# Patient Record
Sex: MALE | Race: WHITE | NOT HISPANIC OR LATINO | Employment: OTHER | URBAN - METROPOLITAN AREA
[De-identification: names, ages, dates, MRNs, and addresses within clinical notes are randomized per-mention and may not be internally consistent; named-entity substitution may affect disease eponyms.]

---

## 2017-07-10 ENCOUNTER — OFFICE VISIT (OUTPATIENT)
Dept: INTERNAL MEDICINE | Facility: CLINIC | Age: 67
End: 2017-07-10

## 2017-07-10 VITALS
BODY MASS INDEX: 31.78 KG/M2 | OXYGEN SATURATION: 98 % | HEART RATE: 73 BPM | DIASTOLIC BLOOD PRESSURE: 82 MMHG | HEIGHT: 70 IN | SYSTOLIC BLOOD PRESSURE: 128 MMHG | WEIGHT: 222 LBS

## 2017-07-10 DIAGNOSIS — E11.9 CONTROLLED TYPE 2 DIABETES MELLITUS WITHOUT COMPLICATION, WITHOUT LONG-TERM CURRENT USE OF INSULIN (HCC): ICD-10-CM

## 2017-07-10 DIAGNOSIS — I25.10 CORONARY ARTERIOSCLEROSIS IN NATIVE ARTERY: ICD-10-CM

## 2017-07-10 DIAGNOSIS — I35.9 AORTIC VALVE DISORDER: Primary | ICD-10-CM

## 2017-07-10 DIAGNOSIS — E78.2 MIXED HYPERLIPIDEMIA: ICD-10-CM

## 2017-07-10 LAB
EXPIRATION DATE: NORMAL
HBA1C MFR BLD: 5.7 %
Lab: NORMAL

## 2017-07-10 PROCEDURE — 83036 HEMOGLOBIN GLYCOSYLATED A1C: CPT | Performed by: INTERNAL MEDICINE

## 2017-07-10 PROCEDURE — 99214 OFFICE O/P EST MOD 30 MIN: CPT | Performed by: INTERNAL MEDICINE

## 2017-07-10 RX ORDER — GLIMEPIRIDE 4 MG/1
TABLET ORAL
COMMUNITY
Start: 2017-04-29 | End: 2018-01-22 | Stop reason: SDUPTHER

## 2017-07-10 RX ORDER — AMOXICILLIN 500 MG/1
CAPSULE ORAL
COMMUNITY
Start: 2017-06-27 | End: 2018-01-22

## 2017-07-10 NOTE — PROGRESS NOTES
Subjective   Cuco Easton is a 66 y.o. male.     History of Present Illness   For follow up of  HTN on meds no chest pain sob or headaches.  Aortic valve disease, no sob or palpitations.  Hyperlipidemia on meds, no muscle aches.  NIDDM sugars have been doing well.      The following portions of the patient's history were reviewed and updated as appropriate: allergies, current medications, past medical history and problem list.    Review of Systems   Constitutional: Negative.    Respiratory: Negative.  Negative for cough, chest tightness, shortness of breath and wheezing.    Cardiovascular: Negative.  Negative for chest pain, palpitations and leg swelling.   Gastrointestinal: Negative.  Negative for abdominal distention and abdominal pain.       Objective   Physical Exam   Constitutional: He appears well-developed and well-nourished.   Neck: Normal range of motion. Neck supple.   Cardiovascular: Normal rate, regular rhythm and normal heart sounds.  Exam reveals no gallop and no friction rub.    No murmur heard.  Pulmonary/Chest: Effort normal and breath sounds normal. No respiratory distress. He has no wheezes. He has no rales.   Abdominal: Soft. Bowel sounds are normal. He exhibits no distension and no mass. There is no tenderness.   Nursing note and vitals reviewed.      Assessment/Plan   Cuco was seen today for follow-up.    Diagnoses and all orders for this visit:    Aortic valve disorder    Coronary arteriosclerosis in native artery    Mixed hyperlipidemia    Controlled type 2 diabetes mellitus without complication, without long-term current use of insulin  -     POC Glycosylated Hemoglobin (Hb A1C)    A1C = 5.7  All problems are stable.  Same med.  Recheck 6 months.

## 2018-01-01 ENCOUNTER — HOSPITAL ENCOUNTER (OUTPATIENT)
Dept: ULTRASOUND IMAGING | Facility: HOSPITAL | Age: 68
Discharge: HOME | End: 2018-10-04
Admitting: PHYSICIAN ASSISTANT

## 2018-01-01 DIAGNOSIS — Z13.6 ENCOUNTER FOR ABDOMINAL AORTIC ANEURYSM (AAA) SCREENING: ICD-10-CM

## 2018-01-01 DIAGNOSIS — Z87.891 HISTORY OF TOBACCO USE DISORDER: ICD-10-CM

## 2018-01-01 DIAGNOSIS — Z00.00 INITIAL MEDICARE ANNUAL WELLNESS VISIT: ICD-10-CM

## 2018-01-01 PROCEDURE — 76706 US ABDL AORTA SCREEN AAA: CPT

## 2018-01-22 ENCOUNTER — OFFICE VISIT (OUTPATIENT)
Dept: INTERNAL MEDICINE | Facility: CLINIC | Age: 68
End: 2018-01-22

## 2018-01-22 VITALS
HEART RATE: 68 BPM | SYSTOLIC BLOOD PRESSURE: 138 MMHG | WEIGHT: 219 LBS | HEIGHT: 70 IN | BODY MASS INDEX: 31.35 KG/M2 | DIASTOLIC BLOOD PRESSURE: 82 MMHG | RESPIRATION RATE: 17 BRPM

## 2018-01-22 DIAGNOSIS — Z79.899 DRUG THERAPY: ICD-10-CM

## 2018-01-22 DIAGNOSIS — E78.2 MIXED HYPERLIPIDEMIA: ICD-10-CM

## 2018-01-22 DIAGNOSIS — E11.9 CONTROLLED TYPE 2 DIABETES MELLITUS WITHOUT COMPLICATION, WITHOUT LONG-TERM CURRENT USE OF INSULIN (HCC): ICD-10-CM

## 2018-01-22 DIAGNOSIS — Z12.5 SCREENING FOR PROSTATE CANCER: ICD-10-CM

## 2018-01-22 DIAGNOSIS — I35.9 AORTIC VALVE DISORDER: Primary | ICD-10-CM

## 2018-01-22 DIAGNOSIS — I25.10 CORONARY ARTERIOSCLEROSIS IN NATIVE ARTERY: ICD-10-CM

## 2018-01-22 LAB
A/C: NORMAL
ALBUMIN SERPL-MCNC: 4.7 G/DL (ref 3.2–4.8)
ALBUMIN/GLOB SERPL: 1.7 G/DL (ref 1.5–2.5)
ALP SERPL-CCNC: 70 U/L (ref 25–100)
ALT SERPL W P-5'-P-CCNC: 37 U/L (ref 7–40)
ANION GAP SERPL CALCULATED.3IONS-SCNC: 12 MMOL/L (ref 3–11)
ARTICHOKE IGE QN: 83 MG/DL (ref 0–130)
AST SERPL-CCNC: 25 U/L (ref 0–33)
BILIRUB SERPL-MCNC: 0.5 MG/DL (ref 0.3–1.2)
BUN BLD-MCNC: 25 MG/DL (ref 9–23)
BUN/CREAT SERPL: 25 (ref 7–25)
CALCIUM SPEC-SCNC: 9.6 MG/DL (ref 8.7–10.4)
CHLORIDE SERPL-SCNC: 101 MMOL/L (ref 99–109)
CHOLEST SERPL-MCNC: 140 MG/DL (ref 0–200)
CO2 SERPL-SCNC: 24 MMOL/L (ref 20–31)
CREAT BLD-MCNC: 1 MG/DL (ref 0.6–1.3)
DEPRECATED RDW RBC AUTO: 43.6 FL (ref 37–54)
ERYTHROCYTE [DISTWIDTH] IN BLOOD BY AUTOMATED COUNT: 12.3 % (ref 11.3–14.5)
EXPIRATION DATE: NORMAL
EXPIRATION DATE: NORMAL
GFR SERPL CREATININE-BSD FRML MDRD: 74 ML/MIN/1.73
GLOBULIN UR ELPH-MCNC: 2.8 GM/DL
GLUCOSE BLD-MCNC: 110 MG/DL (ref 70–100)
HBA1C MFR BLD: 6 %
HCT VFR BLD AUTO: 44.8 % (ref 38.9–50.9)
HDLC SERPL-MCNC: 45 MG/DL (ref 40–60)
HGB BLD-MCNC: 15.4 G/DL (ref 13.1–17.5)
Lab: NORMAL
Lab: NORMAL
MCH RBC QN AUTO: 33.5 PG (ref 27–31)
MCHC RBC AUTO-ENTMCNC: 34.4 G/DL (ref 32–36)
MCV RBC AUTO: 97.4 FL (ref 80–99)
PLATELET # BLD AUTO: 237 10*3/MM3 (ref 150–450)
PMV BLD AUTO: 10.3 FL (ref 6–12)
POC CREATININE URINE: 200
POC MICROALBUMIN URINE: 30
POTASSIUM BLD-SCNC: 4.4 MMOL/L (ref 3.5–5.5)
PROT SERPL-MCNC: 7.5 G/DL (ref 5.7–8.2)
PSA SERPL-MCNC: 1.86 NG/ML (ref 0–4)
RBC # BLD AUTO: 4.6 10*6/MM3 (ref 4.2–5.76)
SODIUM BLD-SCNC: 137 MMOL/L (ref 132–146)
TRIGL SERPL-MCNC: 133 MG/DL (ref 0–150)
WBC NRBC COR # BLD: 7.9 10*3/MM3 (ref 3.5–10.8)

## 2018-01-22 PROCEDURE — 36415 COLL VENOUS BLD VENIPUNCTURE: CPT | Performed by: INTERNAL MEDICINE

## 2018-01-22 PROCEDURE — 80061 LIPID PANEL: CPT | Performed by: INTERNAL MEDICINE

## 2018-01-22 PROCEDURE — 82044 UR ALBUMIN SEMIQUANTITATIVE: CPT | Performed by: INTERNAL MEDICINE

## 2018-01-22 PROCEDURE — G0103 PSA SCREENING: HCPCS | Performed by: INTERNAL MEDICINE

## 2018-01-22 PROCEDURE — 85027 COMPLETE CBC AUTOMATED: CPT | Performed by: INTERNAL MEDICINE

## 2018-01-22 PROCEDURE — 83036 HEMOGLOBIN GLYCOSYLATED A1C: CPT | Performed by: INTERNAL MEDICINE

## 2018-01-22 PROCEDURE — 99214 OFFICE O/P EST MOD 30 MIN: CPT | Performed by: INTERNAL MEDICINE

## 2018-01-22 PROCEDURE — 80053 COMPREHEN METABOLIC PANEL: CPT | Performed by: INTERNAL MEDICINE

## 2018-01-22 RX ORDER — GLIMEPIRIDE 4 MG/1
4 TABLET ORAL
Qty: 90 TABLET | Refills: 3 | Status: SHIPPED | OUTPATIENT
Start: 2018-01-22 | End: 2018-02-06 | Stop reason: SDUPTHER

## 2018-01-22 NOTE — PROGRESS NOTES
Subjective   Cuco Easton is a 67 y.o. male.     History of Present Illness   For follow up of  Aortic valve disease, post replacement doing well, no sob no chest pain.  NIDDM sugars are doing well.  Hyperlipidemia on meds, no muscle aches.  CAD no new symptoms.      The following portions of the patient's history were reviewed and updated as appropriate: allergies, current medications, past family history, past medical history, past social history, past surgical history and problem list.    Review of Systems   Constitutional: Negative.  Negative for activity change, appetite change, fatigue and fever.   HENT: Negative.  Negative for dental problem, ear pain, hearing loss, postnasal drip, rhinorrhea, sinus pressure, sore throat, trouble swallowing and voice change.    Eyes: Negative.  Negative for redness and visual disturbance.        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   Respiratory: Negative.  Negative for cough, chest tightness, shortness of breath and wheezing.    Cardiovascular: Negative.  Negative for chest pain, palpitations and leg swelling.   Gastrointestinal: Negative.  Negative for abdominal distention, abdominal pain, blood in stool, constipation, diarrhea and nausea.   Endocrine: Negative.  Negative for polydipsia and polyuria.   Genitourinary: Negative.  Negative for decreased urine volume, dysuria, hematuria and urgency.   Musculoskeletal: Negative.  Negative for arthralgias, back pain and neck pain.   Skin: Negative.  Negative for pallor and rash.   Allergic/Immunologic: Negative.    Neurological: Negative.  Negative for dizziness, tremors, speech difficulty, weakness, numbness and headaches.   Hematological: Negative.  Negative for adenopathy.   Psychiatric/Behavioral: Negative.  Negative for agitation, behavioral problems, confusion, dysphoric mood and sleep disturbance. The patient is not nervous/anxious and is not hyperactive.        Objective   Physical Exam    Constitutional: He is oriented to person, place, and time. He appears well-developed and well-nourished.   HENT:   Head: Normocephalic and atraumatic.   Right Ear: External ear normal.   Left Ear: External ear normal.   Nose: Nose normal.   Mouth/Throat: Oropharynx is clear and moist.   Eyes: Conjunctivae and EOM are normal. Pupils are equal, round, and reactive to light.   Fundoscopic exam:       The right eye shows no AV nicking and no hemorrhage.        The left eye shows no AV nicking and no hemorrhage.   Neck: Normal range of motion. Neck supple. No thyromegaly present.   Cardiovascular: Normal rate, regular rhythm and intact distal pulses.  Exam reveals no gallop and no friction rub.    Murmur (small systolic murmur.) heard.  Carotids normal.   Pulmonary/Chest: Effort normal and breath sounds normal. No respiratory distress. He has no wheezes. He has no rales. He exhibits no tenderness.   Abdominal: Soft. Bowel sounds are normal. He exhibits no distension and no mass. There is no tenderness. No hernia.   Genitourinary: Rectum normal, prostate normal, testes normal and penis normal.   Musculoskeletal: Normal range of motion. He exhibits no edema.   Lymphadenopathy:     He has no cervical adenopathy.   Neurological: He is alert and oriented to person, place, and time. He has normal reflexes. No cranial nerve deficit.   Skin: Skin is warm and dry.   Psychiatric: He has a normal mood and affect. His behavior is normal. Judgment and thought content normal.   Nursing note and vitals reviewed.      Assessment/Plan   Cuco was seen today for aortic valve disorder.    Diagnoses and all orders for this visit:    Aortic valve disorder    Coronary arteriosclerosis in native artery    Mixed hyperlipidemia  -     Comprehensive Metabolic Panel  -     Lipid Panel    Controlled type 2 diabetes mellitus without complication, without long-term current use of insulin  -     glucose blood (ONE TOUCH ULTRA TEST) test strip; Use  bid  -     POC Microalbumin  -     POC Glycosylated Hemoglobin (Hb A1C)    Screening for prostate cancer  -     PSA Screen    Drug therapy  -     CBC (No Diff)    Other orders  -     metFORMIN (GLUCOPHAGE) 500 MG tablet; Take 2 tablets by mouth 2 (Two) Times a Day With Meals.  -     glimepiride (AMARYL) 4 MG tablet; Take 1 tablet by mouth Every Morning Before Breakfast.    All problems are stable.  Labs as noted.  Same meds.  Recheck here 6 months.

## 2018-02-06 RX ORDER — GLIMEPIRIDE 4 MG/1
TABLET ORAL
Qty: 180 TABLET | Refills: 3 | Status: SHIPPED | OUTPATIENT
Start: 2018-02-06 | End: 2018-07-23 | Stop reason: SDUPTHER

## 2018-02-16 ENCOUNTER — TELEPHONE (OUTPATIENT)
Dept: INTERNAL MEDICINE | Facility: CLINIC | Age: 68
End: 2018-02-16

## 2018-02-16 NOTE — TELEPHONE ENCOUNTER
----- Message from Maira Lazar sent at 2/16/2018  9:37 AM EST -----  Contact: SELF  CALLED JOE WALL[facility] TO SCHEDULE MEDICARE WELLNESS VISIT, PATIENT SAID HE DOES NOT NEED THIS APPT.

## 2018-04-18 ENCOUNTER — TELEPHONE (OUTPATIENT)
Dept: INTERNAL MEDICINE | Facility: CLINIC | Age: 68
End: 2018-04-18

## 2018-04-18 NOTE — TELEPHONE ENCOUNTER
----- Message from Kaur Blue sent at 4/18/2018 11:20 AM EDT -----  WIFE-BRUCE WALLISTMBUFH-[phone]-CAN LEAVE A MESSAGE    LIVES IN [place]-THEY HAD AN OUTBREAK OF HEP A.  THEY READ IT IS RECOMMENDED BY [facility] TO GET A HEP A SHOT.  SHOULD THEY GET THIS?  WANTS DR DHILLON Ok.  PT HAS HAD HEART ISSUES AND HAD A HEART VALVE REPLACEMENT

## 2018-06-27 ENCOUNTER — TELEPHONE (OUTPATIENT)
Dept: INTERNAL MEDICINE | Facility: CLINIC | Age: 68
End: 2018-06-27

## 2018-07-23 ENCOUNTER — OFFICE VISIT (OUTPATIENT)
Dept: INTERNAL MEDICINE | Facility: CLINIC | Age: 68
End: 2018-07-23

## 2018-07-23 VITALS
DIASTOLIC BLOOD PRESSURE: 82 MMHG | RESPIRATION RATE: 20 BRPM | BODY MASS INDEX: 32 KG/M2 | WEIGHT: 223 LBS | SYSTOLIC BLOOD PRESSURE: 118 MMHG | HEART RATE: 74 BPM

## 2018-07-23 VITALS
DIASTOLIC BLOOD PRESSURE: 82 MMHG | RESPIRATION RATE: 18 BRPM | TEMPERATURE: 97.5 F | SYSTOLIC BLOOD PRESSURE: 118 MMHG | HEART RATE: 74 BPM | HEIGHT: 73 IN | WEIGHT: 223 LBS | BODY MASS INDEX: 29.55 KG/M2

## 2018-07-23 DIAGNOSIS — E78.2 MIXED HYPERLIPIDEMIA: ICD-10-CM

## 2018-07-23 DIAGNOSIS — E11.9 CONTROLLED TYPE 2 DIABETES MELLITUS WITHOUT COMPLICATION, UNSPECIFIED LONG TERM INSULIN USE STATUS: Primary | ICD-10-CM

## 2018-07-23 DIAGNOSIS — Z87.891 HISTORY OF TOBACCO USE DISORDER: ICD-10-CM

## 2018-07-23 DIAGNOSIS — Z11.59 NEED FOR HEPATITIS C SCREENING TEST: ICD-10-CM

## 2018-07-23 DIAGNOSIS — E11.9 CONTROLLED TYPE 2 DIABETES MELLITUS WITHOUT COMPLICATION, WITHOUT LONG-TERM CURRENT USE OF INSULIN (HCC): ICD-10-CM

## 2018-07-23 DIAGNOSIS — I35.9 AORTIC VALVE DISORDER: ICD-10-CM

## 2018-07-23 DIAGNOSIS — Z00.00 INITIAL MEDICARE ANNUAL WELLNESS VISIT: Primary | ICD-10-CM

## 2018-07-23 DIAGNOSIS — Z23 NEED FOR PNEUMOCOCCAL VACCINATION: ICD-10-CM

## 2018-07-23 DIAGNOSIS — I25.10 CORONARY ARTERIOSCLEROSIS IN NATIVE ARTERY: ICD-10-CM

## 2018-07-23 DIAGNOSIS — Z13.6 ENCOUNTER FOR ABDOMINAL AORTIC ANEURYSM (AAA) SCREENING: ICD-10-CM

## 2018-07-23 LAB
EXPIRATION DATE: NORMAL
HBA1C MFR BLD: 6 %
HCV AB SER DONR QL: NORMAL
Lab: NORMAL

## 2018-07-23 PROCEDURE — 90732 PPSV23 VACC 2 YRS+ SUBQ/IM: CPT | Performed by: PHYSICIAN ASSISTANT

## 2018-07-23 PROCEDURE — 36415 COLL VENOUS BLD VENIPUNCTURE: CPT | Performed by: INTERNAL MEDICINE

## 2018-07-23 PROCEDURE — 99214 OFFICE O/P EST MOD 30 MIN: CPT | Performed by: INTERNAL MEDICINE

## 2018-07-23 PROCEDURE — 36415 COLL VENOUS BLD VENIPUNCTURE: CPT | Performed by: PHYSICIAN ASSISTANT

## 2018-07-23 PROCEDURE — G0438 PPPS, INITIAL VISIT: HCPCS | Performed by: PHYSICIAN ASSISTANT

## 2018-07-23 PROCEDURE — G0009 ADMIN PNEUMOCOCCAL VACCINE: HCPCS | Performed by: PHYSICIAN ASSISTANT

## 2018-07-23 PROCEDURE — 83036 HEMOGLOBIN GLYCOSYLATED A1C: CPT | Performed by: INTERNAL MEDICINE

## 2018-07-23 PROCEDURE — 86803 HEPATITIS C AB TEST: CPT | Performed by: PHYSICIAN ASSISTANT

## 2018-07-23 RX ORDER — GLIMEPIRIDE 4 MG/1
4 TABLET ORAL
Qty: 90 TABLET | Refills: 3
Start: 2018-07-23 | End: 2019-01-01 | Stop reason: SDUPTHER

## 2018-07-23 NOTE — PROGRESS NOTES
QUICK REFERENCE INFORMATION:  The ABCs of the Annual Wellness Visit    Initial Medicare Wellness Visit    HEALTH RISK ASSESSMENT    1950    Recent Hospitalizations:   No hospitalization(s) within the last year..        Current Medical Providers:  Patient Care Team:  Reji Duran MD as PCP - General  Reji Duran MD as PCP - Claims Attributed  Cuco Casiano MD as Consulting Physician (Cardiology)  Donald Struod MD as Consulting Physician (Dermatology)        Smoking Status:  History   Smoking Status   • Former Smoker   • Packs/day: 0.50   • Years: 35.00   • Types: Cigarettes   • Quit date: 11/12/2013   Smokeless Tobacco   • Not on file       Alcohol Consumption:  History   Alcohol Use   • Yes     Comment: social drinker       Depression Screen:   PHQ-2/PHQ-9 Depression Screening 7/23/2018   Little interest or pleasure in doing things 0   Feeling down, depressed, or hopeless 0   Total Score 0       Health Habits and Functional and Cognitive Screening:  Functional & Cognitive Status 7/23/2018   Do you have difficulty preparing food and eating? No   Do you have difficulty bathing yourself, getting dressed or grooming yourself? No   Do you have difficulty using the toilet? No   Do you have difficulty moving around from place to place? No   Do you have trouble with steps or getting out of a bed or a chair? No   In the past year have you fallen or experienced a near fall? No   Current Diet Well Balanced Diet   Dental Exam Up to date   Eye Exam Up to date   Exercise (times per week) 0 times per week   Current Exercise Activities Include None   Do you need help using the phone?  No   Are you deaf or do you have serious difficulty hearing?  No   Do you need help with transportation? No   Do you need help shopping? No   Do you need help preparing meals?  No   Do you need help with housework?  No   Do you need help with laundry? No   Do you need help taking your medications? No   Do you need help  managing money? No   Do you ever drive or ride in a car without wearing a seat belt? No           Does the patient have evidence of cognitive impairment? No    Asiprin use counseling: Taking ASA appropriately as indicated      Recent Lab Results:.    Results for orders placed or performed in visit on 07/23/18   POC Glycosylated Hemoglobin (Hb A1C)   Result Value Ref Range    Hemoglobin A1C 6.0 %    Lot Number [id number]     Expiration Date 12-19          Visual Acuity:  No exam data present    Age-appropriate Screening Schedule:  Refer to the list below for future screening recommendations based on patient's age, sex and/or medical conditions. Orders for these recommended tests are listed in the plan section. The patient has been provided with a written plan.    Health Maintenance   Topic Date Due   • TDAP/TD VACCINES (1 - Tdap) 09/[birth year]/1968   • ZOSTER VACCINE (2 of 3) 03/24/2014   • INFLUENZA VACCINE  08/01/2018   • DIABETIC EYE EXAM  12/12/2018   • DIABETIC FOOT EXAM  01/22/2019   • LIPID PANEL  01/22/2019   • URINE MICROALBUMIN  01/22/2019   • HEMOGLOBIN A1C  01/23/2019   • COLONOSCOPY  05/16/2024   • PNEUMOCOCCAL VACCINES (65+ LOW/MEDIUM RISK)  Completed        Subjective   History of Present Illness    Cuco Easton is a 67 y.o. male who presents for an Annual Wellness Visit. He checks his blood glucose twice daily, usually running 160-170 in AM fasting and around  in PM. He was recently changed to glimepiride once daily instead of BID dosing due to well-controlled DM and HbA1c of 6.0. He is taking all medications as directed. Doing well with HTN and HLD on current medications. He reports no pain and no acute concerns today. He denies chest pain, SOA, fevers, chills, abdominal pain, N/V/D, headaches, spots in vision, difficulty with BMs or urination, or any other symptoms today.   Needs Hep C screening and AAA screening. Patient smoked >1/2 ppd x 35 years, quit date November 2013.  Patient thinks he may  have had AAA screening in the past prior to heart surgery but unsure.   Needs PPSV23 and Shingrix. Patient has had Zostavax in the past.       The following portions of the patient's history were reviewed and updated as appropriate: allergies, current medications, past family history, past medical history, past social history, past surgical history and problem list.    Outpatient Medications Prior to Visit   Medication Sig Dispense Refill   • aspirin 81 MG tablet      • atorvastatin (LIPITOR) 20 MG tablet      • glimepiride (AMARYL) 4 MG tablet Take 1 tablet by mouth Every Morning Before Breakfast. 90 tablet 3   • glucose blood (ONE TOUCH ULTRA TEST) test strip Use bid 100 each 6   • lisinopril (PRINIVIL,ZESTRIL) 5 MG tablet      • metFORMIN (GLUCOPHAGE) 500 MG tablet Take 2 tablets by mouth 2 (Two) Times a Day With Meals. 360 tablet 3   • metoprolol tartrate (LOPRESSOR) 50 MG tablet 50 mg 2 (two) times a day.     • Multiple Vitamin (MULTI-VITAMIN) tablet      • Omega-3 Fatty Acids (FISH OIL) 1000 MG capsule capsule Take 1 capsule by mouth daily.     • tadalafil (CIALIS) 20 MG tablet      • Unable to find Med Name: Osteo-Bi-Flex TABS       No facility-administered medications prior to visit.        Patient Active Problem List   Diagnosis   • Aortic valve disorder   • Coronary arteriosclerosis in native artery   • Mixed hyperlipidemia   • Controlled type 2 diabetes mellitus without complication, without long-term current use of insulin (CMS/[facility])       Advance Care Planning:  has NO advance directive - information provided to the patient today    Identification of Risk Factors:  Risk factors include: cardiovascular risk and inactivity.    Review of Systems    Compared to one year ago, the patient feels his physical health is the same.  Compared to one year ago, the patient feels his mental health is the same.    Objective     Physical Exam    Vitals:    07/23/18 1035   BP: 118/82   BP Location: Right arm   Patient  "Position: Sitting   Cuff Size: Adult   Pulse: 74   Resp: 18   Temp: 97.5 °F (36.4 °C)   Weight: 101 kg (223 lb)   Height: 185.4 cm (73\")   PainSc: 0-No pain       Patient's Body mass index is 29.42 kg/m². BMI is above normal parameters. Recommendations include: exercise counseling.      Assessment/Plan   Patient Self-Management and Personalized Health Advice  The patient has been provided with information about: exercise, designing advance directives and Shingrix vaccination and preventive services including:   · Advance directive, Exercise counseling provided, Fall Risk assessment done, Pneumococcal vaccine , Screening for AAA, referral for ultrasound placed, Shingrix discussed.    Visit Diagnoses:    ICD-10-CM ICD-9-CM   1. Initial Medicare annual wellness visit Z00.00 V70.0   2. Controlled type 2 diabetes mellitus without complication, without long-term current use of insulin (CMS/[facility]) E11.9 250.00   3. Mixed hyperlipidemia E78.2 272.2   4. Need for pneumococcal vaccination Z23 V03.82   5. Need for hepatitis C screening test Z11.59 V73.89   6. Encounter for abdominal aortic aneurysm (AAA) screening Z13.6 V81.2   7. History of tobacco use disorder Z87.891 V15.82       Orders Placed This Encounter   Procedures   • [place] aaa screen limited     Standing Status:   Future     Standing Expiration Date:   7/23/2019     Order Specific Question:   Is the patient male between 65-75 years old?   The screening exam is only eligible for Medicare patients that are male, between 65-75 years old, have a family history of AAA or has smoked at least 100 cigarettes in their lifetime.     Answer:   Yes     Order Specific Question:   Reason for Exam:     Answer:   AAA screening, hx nicotine abuse- cigarette smoking   • Pneumococcal Polysaccharide Vaccine 23-Valent (PPSV23) Greater Than or Equal To 3yo Subcutaneous / IM   • Hepatitis C Antibody       Outpatient Encounter Prescriptions as of 7/23/2018   Medication Sig Dispense Refill   • " aspirin 81 MG tablet      • atorvastatin (LIPITOR) 20 MG tablet      • glimepiride (AMARYL) 4 MG tablet Take 1 tablet by mouth Every Morning Before Breakfast. 90 tablet 3   • glucose blood (ONE TOUCH ULTRA TEST) test strip Use bid 100 each 6   • lisinopril (PRINIVIL,ZESTRIL) 5 MG tablet      • metFORMIN (GLUCOPHAGE) 500 MG tablet Take 2 tablets by mouth 2 (Two) Times a Day With Meals. 360 tablet 3   • metoprolol tartrate (LOPRESSOR) 50 MG tablet 50 mg 2 (two) times a day.     • Multiple Vitamin (MULTI-VITAMIN) tablet      • Omega-3 Fatty Acids (FISH OIL) 1000 MG capsule capsule Take 1 capsule by mouth daily.     • tadalafil (CIALIS) 20 MG tablet      • Unable to find Med Name: Osteo-Bi-Flex TABS     • [DISCONTINUED] glimepiride (AMARYL) 4 MG tablet TAKE ONE TABLET BY MOUTH TWICE DAILY 180 tablet 3     No facility-administered encounter medications on file as of 7/23/2018.        Reviewed use of high risk medication in the elderly: no  Reviewed for potential of harmful drug interactions in the elderly: not applicable    Discussed Shingrix and patient will inquire at pharmacy for this vaccination.  PPV23 updated.   Hepatitis C screening ordered.  AAA screening ordered-- no record of previous AAA screening with Dr. Casiano's office, per Chrissy who I spoke to by phone today; has only had Echo and cardiac stress test by their office in the past.       Follow Up:  Return in about 1 year (around 7/23/2019) for Medicare wellness, subsequent.     An After Visit Summary and PPPS with all of these plans were given to the patient.

## 2018-07-23 NOTE — PROGRESS NOTES
Subjective   Cuco Easton is a 67 y.o. male.     History of Present Illness   For follow up of  Aortic valve disease.  Doing well, no sob, chest pain or palpitations.  NIDDM sugars are doing well.  HTN on meds, no headaches.  MIxed hyperlipidemia on meds, no muscle aches.      The following portions of the patient's history were reviewed and updated as appropriate: allergies, current medications, past medical history and problem list.    Review of Systems   Constitutional: Negative.    Eyes: Negative.    Respiratory: Negative.  Negative for chest tightness and wheezing.    Cardiovascular: Negative.  Negative for chest pain, palpitations and leg swelling.   Gastrointestinal: Negative.  Negative for abdominal distention and abdominal pain.   Genitourinary: Negative.        Objective   Physical Exam   Constitutional: He appears well-developed and well-nourished.   Neck: Normal range of motion. Neck supple.   Cardiovascular: Normal rate and regular rhythm.  Exam reveals no gallop and no friction rub.    Murmur (small systolic murmur.) heard.  Pulmonary/Chest: Effort normal and breath sounds normal. No respiratory distress. He has no wheezes. He has no rales. He exhibits no tenderness.   Abdominal: Soft. Bowel sounds are normal. He exhibits no distension. There is no tenderness. There is no guarding.   Musculoskeletal: He exhibits no edema.   Nursing note and vitals reviewed.        Assessment/Plan   Cuco was seen today for aortic valve disorder.    Diagnoses and all orders for this visit:    Controlled type 2 diabetes mellitus without complication, unspecified long term insulin use status (CMS/[facility])  -     POC Glycosylated Hemoglobin (Hb A1C)    Aortic valve disorder    Coronary arteriosclerosis in native artery    Mixed hyperlipidemia    Controlled type 2 diabetes mellitus without complication, without long-term current use of insulin (CMS/[facility])    Other orders  -     glimepiride (AMARYL) 4 MG tablet; Take 1  tablet by mouth Every Morning Before Breakfast.    A1C = 6.0.  Will reduce glimepiride to 4mg daily.  Rest of problems are stable  Same meds otherwise.  Recheck 6 months.

## 2018-08-07 ENCOUNTER — APPOINTMENT (OUTPATIENT)
Dept: ULTRASOUND IMAGING | Facility: HOSPITAL | Age: 68
End: 2018-08-07

## 2018-08-17 ENCOUNTER — APPOINTMENT (OUTPATIENT)
Dept: ULTRASOUND IMAGING | Facility: HOSPITAL | Age: 68
End: 2018-08-17

## 2018-08-28 ENCOUNTER — APPOINTMENT (OUTPATIENT)
Dept: ULTRASOUND IMAGING | Facility: HOSPITAL | Age: 68
End: 2018-08-28

## 2018-09-11 ENCOUNTER — APPOINTMENT (OUTPATIENT)
Dept: ULTRASOUND IMAGING | Facility: HOSPITAL | Age: 68
End: 2018-09-11

## 2019-01-01 ENCOUNTER — TELEPHONE (OUTPATIENT)
Dept: CARDIOLOGY | Facility: HOSPITAL | Age: 69
End: 2019-01-01

## 2019-01-01 ENCOUNTER — HOSPITAL ENCOUNTER (OUTPATIENT)
Dept: CARDIOLOGY | Facility: HOSPITAL | Age: 69
Discharge: HOME | End: 2019-09-24

## 2019-01-01 ENCOUNTER — TRANSCRIBE ORDERS (OUTPATIENT)
Dept: ADMINISTRATIVE | Facility: HOSPITAL | Age: 69
End: 2019-01-01

## 2019-01-01 ENCOUNTER — HOSPITAL ENCOUNTER (OUTPATIENT)
Facility: HOSPITAL | Age: 69
Setting detail: HOSPITAL OUTPATIENT SURGERY
Discharge: HOME | End: 2019-09-04
Attending: INTERNAL MEDICINE | Admitting: INTERNAL MEDICINE

## 2019-01-01 ENCOUNTER — OFFICE VISIT (OUTPATIENT)
Dept: CARDIAC SURGERY | Facility: CLINIC | Age: 69
End: 2019-01-01

## 2019-01-01 ENCOUNTER — APPOINTMENT (OUTPATIENT)
Dept: CARDIOLOGY | Facility: HOSPITAL | Age: 69
End: 2019-01-01

## 2019-01-01 ENCOUNTER — TELEPHONE (OUTPATIENT)
Dept: INTERNAL MEDICINE | Facility: CLINIC | Age: 69
End: 2019-01-01

## 2019-01-01 ENCOUNTER — HOSPITAL ENCOUNTER (OUTPATIENT)
Dept: CT IMAGING | Facility: HOSPITAL | Age: 69
Discharge: HOME | End: 2019-09-17
Admitting: NURSE PRACTITIONER

## 2019-01-01 ENCOUNTER — OFFICE VISIT (OUTPATIENT)
Dept: INTERNAL MEDICINE | Facility: CLINIC | Age: 69
End: 2019-01-01

## 2019-01-01 VITALS
RESPIRATION RATE: 15 BRPM | TEMPERATURE: 98.1 F | SYSTOLIC BLOOD PRESSURE: 107 MMHG | OXYGEN SATURATION: 93 % | HEIGHT: 73 IN | DIASTOLIC BLOOD PRESSURE: 78 MMHG | WEIGHT: 209.88 LBS | BODY MASS INDEX: 27.82 KG/M2 | HEART RATE: 68 BPM

## 2019-01-01 VITALS
RESPIRATION RATE: 16 BRPM | DIASTOLIC BLOOD PRESSURE: 66 MMHG | HEART RATE: 75 BPM | SYSTOLIC BLOOD PRESSURE: 88 MMHG | OXYGEN SATURATION: 96 %

## 2019-01-01 VITALS
BODY MASS INDEX: 28.39 KG/M2 | HEART RATE: 106 BPM | WEIGHT: 214.2 LBS | HEIGHT: 73 IN | OXYGEN SATURATION: 98 % | DIASTOLIC BLOOD PRESSURE: 74 MMHG | SYSTOLIC BLOOD PRESSURE: 126 MMHG | TEMPERATURE: 97.7 F

## 2019-01-01 VITALS
WEIGHT: 219 LBS | RESPIRATION RATE: 19 BRPM | BODY MASS INDEX: 28.89 KG/M2 | DIASTOLIC BLOOD PRESSURE: 80 MMHG | HEART RATE: 74 BPM | SYSTOLIC BLOOD PRESSURE: 122 MMHG

## 2019-01-01 VITALS
RESPIRATION RATE: 18 BRPM | DIASTOLIC BLOOD PRESSURE: 65 MMHG | BODY MASS INDEX: 27.83 KG/M2 | WEIGHT: 210 LBS | HEART RATE: 81 BPM | SYSTOLIC BLOOD PRESSURE: 108 MMHG | TEMPERATURE: 97.8 F | OXYGEN SATURATION: 97 % | HEIGHT: 73 IN

## 2019-01-01 DIAGNOSIS — R94.39 ABNORMAL STRESS TEST: ICD-10-CM

## 2019-01-01 DIAGNOSIS — I35.9 AORTIC VALVE DISORDER: Primary | ICD-10-CM

## 2019-01-01 DIAGNOSIS — Z79.899 DRUG THERAPY: ICD-10-CM

## 2019-01-01 DIAGNOSIS — I25.10 CORONARY ARTERIOSCLEROSIS IN NATIVE ARTERY: ICD-10-CM

## 2019-01-01 DIAGNOSIS — I50.32 CHRONIC DIASTOLIC CONGESTIVE HEART FAILURE (HCC): Primary | ICD-10-CM

## 2019-01-01 DIAGNOSIS — I50.32 CHRONIC DIASTOLIC CONGESTIVE HEART FAILURE (HCC): ICD-10-CM

## 2019-01-01 DIAGNOSIS — E11.9 CONTROLLED TYPE 2 DIABETES MELLITUS WITHOUT COMPLICATION, WITHOUT LONG-TERM CURRENT USE OF INSULIN (HCC): ICD-10-CM

## 2019-01-01 DIAGNOSIS — I71.21 ASCENDING AORTIC ANEURYSM (HCC): ICD-10-CM

## 2019-01-01 DIAGNOSIS — E78.2 MIXED HYPERLIPIDEMIA: ICD-10-CM

## 2019-01-01 DIAGNOSIS — I35.9 AORTIC VALVE DISORDER: ICD-10-CM

## 2019-01-01 DIAGNOSIS — R93.1 ABNORMAL FINDINGS ON DIAGNOSTIC IMAGING OF HEART AND CORONARY CIRCULATION: ICD-10-CM

## 2019-01-01 DIAGNOSIS — Z12.5 SCREENING FOR PROSTATE CANCER: ICD-10-CM

## 2019-01-01 DIAGNOSIS — R94.39 ABNORMAL STRESS TEST: Primary | ICD-10-CM

## 2019-01-01 LAB
A/C: NORMAL
ALBUMIN SERPL-MCNC: 4.59 G/DL (ref 3.2–4.8)
ALBUMIN/GLOB SERPL: 1.9 G/DL (ref 1.5–2.5)
ALP SERPL-CCNC: 63 U/L (ref 25–100)
ALT SERPL W P-5'-P-CCNC: 31 U/L (ref 7–40)
ANION GAP SERPL CALCULATED.3IONS-SCNC: 12 MMOL/L (ref 3–11)
ANION GAP SERPL CALCULATED.3IONS-SCNC: 13 MMOL/L (ref 5–15)
ARTICHOKE IGE QN: 69 MG/DL (ref 0–130)
ASCENDING AORTA: 4.7 CM
AST SERPL-CCNC: 24 U/L (ref 0–33)
BH CV ECHO MEAS - AO MAX PG: 105 MMHG
BH CV ECHO MEAS - AO MEAN PG: 54 MMHG
BH CV ECHO MEAS - AO V2 MAX: 513 CM/SEC
BH CV ECHO MEAS - AO V2 VTI: 141 CM
BH CV ECHO MEAS - AVA(I,D): 0.6 CM2
BH CV ECHO MEAS - BSA(HAYCOCK): 2.2 M^2
BH CV ECHO MEAS - BSA: 2.2 M^2
BH CV ECHO MEAS - BZI_BMI: 28.8 KILOGRAMS/M^2
BH CV ECHO MEAS - BZI_METRIC_HEIGHT: 182 CM
BH CV ECHO MEAS - BZI_METRIC_WEIGHT: 95.3 KG
BH CV VAS BP LEFT ARM: NORMAL MMHG
BILIRUB SERPL-MCNC: 0.7 MG/DL (ref 0.3–1.2)
BUN BLD-MCNC: 18 MG/DL (ref 8–23)
BUN BLD-MCNC: 31 MG/DL (ref 9–23)
BUN BLDA-MCNC: 19 MG/DL (ref 8–26)
BUN/CREAT SERPL: 17.3 (ref 7–25)
BUN/CREAT SERPL: 24 (ref 7–25)
CA-I BLDA-SCNC: 1.28 MMOL/L (ref 1.2–1.32)
CALCIUM SPEC-SCNC: 9.4 MG/DL (ref 8.6–10.5)
CALCIUM SPEC-SCNC: 9.6 MG/DL (ref 8.7–10.4)
CHLORIDE BLDA-SCNC: 101 MMOL/L (ref 98–109)
CHLORIDE SERPL-SCNC: 101 MMOL/L (ref 98–107)
CHLORIDE SERPL-SCNC: 102 MMOL/L (ref 99–109)
CHOLEST SERPL-MCNC: 141 MG/DL (ref 0–200)
CO2 BLDA-SCNC: 25 MMOL/L (ref 24–29)
CO2 SERPL-SCNC: 22 MMOL/L (ref 20–31)
CO2 SERPL-SCNC: 25 MMOL/L (ref 22–29)
CREAT BLD-MCNC: 1.04 MG/DL (ref 0.76–1.27)
CREAT BLD-MCNC: 1.29 MG/DL (ref 0.6–1.3)
CREAT BLDA-MCNC: 0.9 MG/DL (ref 0.6–1.3)
CREAT BLDA-MCNC: 1.1 MG/DL (ref 0.6–1.3)
DEPRECATED RDW RBC AUTO: 45.2 FL (ref 37–54)
DEPRECATED RDW RBC AUTO: 46.4 FL (ref 37–54)
ERYTHROCYTE [DISTWIDTH] IN BLOOD BY AUTOMATED COUNT: 12.3 % (ref 12.3–15.4)
ERYTHROCYTE [DISTWIDTH] IN BLOOD BY AUTOMATED COUNT: 12.9 % (ref 11.3–14.5)
EXPIRATION DATE: NORMAL
GFR SERPL CREATININE-BSD FRML MDRD: 55 ML/MIN/1.73
GFR SERPL CREATININE-BSD FRML MDRD: 71 ML/MIN/1.73
GLOBULIN UR ELPH-MCNC: 2.4 GM/DL
GLUCOSE BLD-MCNC: 135 MG/DL (ref 65–99)
GLUCOSE BLD-MCNC: 137 MG/DL (ref 70–100)
GLUCOSE BLDC GLUCOMTR-MCNC: 126 MG/DL (ref 70–130)
GLUCOSE BLDC GLUCOMTR-MCNC: 143 MG/DL (ref 70–130)
GLUCOSE BLDC GLUCOMTR-MCNC: 94 MG/DL (ref 70–130)
HBA1C MFR BLD: 5.9 % (ref 4.8–5.6)
HCT VFR BLD AUTO: 43 % (ref 37.5–51)
HCT VFR BLD AUTO: 44.3 % (ref 38.9–50.9)
HCT VFR BLDA CALC: 42 % (ref 38–51)
HDLC SERPL-MCNC: 44 MG/DL (ref 40–60)
HGB BLD-MCNC: 14.3 G/DL (ref 13–17.7)
HGB BLD-MCNC: 15 G/DL (ref 13.1–17.5)
HGB BLDA-MCNC: 14.3 G/DL (ref 12–17)
LV EF 2D ECHO EST: 40 %
Lab: NORMAL
MCH RBC QN AUTO: 32.9 PG (ref 26.6–33)
MCH RBC QN AUTO: 33.3 PG (ref 27–31)
MCHC RBC AUTO-ENTMCNC: 33.3 G/DL (ref 31.5–35.7)
MCHC RBC AUTO-ENTMCNC: 33.9 G/DL (ref 32–36)
MCV RBC AUTO: 98.4 FL (ref 80–99)
MCV RBC AUTO: 99.1 FL (ref 79–97)
PLATELET # BLD AUTO: 180 10*3/MM3 (ref 140–450)
PLATELET # BLD AUTO: 239 10*3/MM3 (ref 150–450)
PMV BLD AUTO: 10.1 FL (ref 6–12)
PMV BLD AUTO: 10.8 FL (ref 6–12)
POC CREATININE URINE: 300
POC MICROALBUMIN URINE: 80
POTASSIUM BLD-SCNC: 4.3 MMOL/L (ref 3.5–5.2)
POTASSIUM BLD-SCNC: 4.6 MMOL/L (ref 3.5–5.5)
POTASSIUM BLDA-SCNC: 4.3 MMOL/L (ref 3.5–4.9)
PROT SERPL-MCNC: 7 G/DL (ref 5.7–8.2)
PSA SERPL-MCNC: 3.94 NG/ML (ref 0–4)
RBC # BLD AUTO: 4.34 10*6/MM3 (ref 4.14–5.8)
RBC # BLD AUTO: 4.5 10*6/MM3 (ref 4.2–5.76)
SODIUM BLD-SCNC: 136 MMOL/L (ref 132–146)
SODIUM BLD-SCNC: 139 MMOL/L (ref 136–145)
SODIUM BLDA-SCNC: 138 MMOL/L (ref 138–146)
TRIGL SERPL-MCNC: 131 MG/DL (ref 0–150)
WBC NRBC COR # BLD: 6.04 10*3/MM3 (ref 3.4–10.8)
WBC NRBC COR # BLD: 9.44 10*3/MM3 (ref 3.5–10.8)

## 2019-01-01 PROCEDURE — 93325 DOPPLER ECHO COLOR FLOW MAPG: CPT | Performed by: INTERNAL MEDICINE

## 2019-01-01 PROCEDURE — C1769 GUIDE WIRE: HCPCS | Performed by: INTERNAL MEDICINE

## 2019-01-01 PROCEDURE — 93325 DOPPLER ECHO COLOR FLOW MAPG: CPT

## 2019-01-01 PROCEDURE — 99214 OFFICE O/P EST MOD 30 MIN: CPT | Performed by: NURSE PRACTITIONER

## 2019-01-01 PROCEDURE — 99152 MOD SED SAME PHYS/QHP 5/>YRS: CPT | Performed by: INTERNAL MEDICINE

## 2019-01-01 PROCEDURE — 93321 DOPPLER ECHO F-UP/LMTD STD: CPT | Performed by: INTERNAL MEDICINE

## 2019-01-01 PROCEDURE — A9270 NON-COVERED ITEM OR SERVICE: HCPCS | Performed by: RADIOLOGY

## 2019-01-01 PROCEDURE — 82962 GLUCOSE BLOOD TEST: CPT

## 2019-01-01 PROCEDURE — 0 IOPAMIDOL PER 1 ML: Performed by: INTERNAL MEDICINE

## 2019-01-01 PROCEDURE — 25010000002 FENTANYL CITRATE (PF) 100 MCG/2ML SOLUTION: Performed by: INTERNAL MEDICINE

## 2019-01-01 PROCEDURE — 71275 CT ANGIOGRAPHY CHEST: CPT

## 2019-01-01 PROCEDURE — 82044 UR ALBUMIN SEMIQUANTITATIVE: CPT | Performed by: INTERNAL MEDICINE

## 2019-01-01 PROCEDURE — 63710000001 METOPROLOL TARTRATE 50 MG TABLET: Performed by: RADIOLOGY

## 2019-01-01 PROCEDURE — 80053 COMPREHEN METABOLIC PANEL: CPT | Performed by: INTERNAL MEDICINE

## 2019-01-01 PROCEDURE — G0103 PSA SCREENING: HCPCS | Performed by: INTERNAL MEDICINE

## 2019-01-01 PROCEDURE — C1894 INTRO/SHEATH, NON-LASER: HCPCS | Performed by: INTERNAL MEDICINE

## 2019-01-01 PROCEDURE — 99152 MOD SED SAME PHYS/QHP 5/>YRS: CPT

## 2019-01-01 PROCEDURE — 36415 COLL VENOUS BLD VENIPUNCTURE: CPT | Performed by: INTERNAL MEDICINE

## 2019-01-01 PROCEDURE — 80061 LIPID PANEL: CPT | Performed by: INTERNAL MEDICINE

## 2019-01-01 PROCEDURE — 25010000002 HEPARIN (PORCINE) PER 1000 UNITS: Performed by: INTERNAL MEDICINE

## 2019-01-01 PROCEDURE — 99205 OFFICE O/P NEW HI 60 MIN: CPT | Performed by: THORACIC SURGERY (CARDIOTHORACIC VASCULAR SURGERY)

## 2019-01-01 PROCEDURE — 99153 MOD SED SAME PHYS/QHP EA: CPT

## 2019-01-01 PROCEDURE — 25010000002 MIDAZOLAM PER 1 MG: Performed by: INTERNAL MEDICINE

## 2019-01-01 PROCEDURE — 93567 NJX CAR CTH SPRVLV AORTGRPHY: CPT | Performed by: INTERNAL MEDICINE

## 2019-01-01 PROCEDURE — 93312 ECHO TRANSESOPHAGEAL: CPT

## 2019-01-01 PROCEDURE — 85014 HEMATOCRIT: CPT

## 2019-01-01 PROCEDURE — 80048 BASIC METABOLIC PNL TOTAL CA: CPT

## 2019-01-01 PROCEDURE — 82565 ASSAY OF CREATININE: CPT

## 2019-01-01 PROCEDURE — 93321 DOPPLER ECHO F-UP/LMTD STD: CPT

## 2019-01-01 PROCEDURE — 74174 CTA ABD&PLVS W/CONTRAST: CPT

## 2019-01-01 PROCEDURE — 0 IOPAMIDOL PER 1 ML: Performed by: NURSE PRACTITIONER

## 2019-01-01 PROCEDURE — 80047 BASIC METABLC PNL IONIZED CA: CPT

## 2019-01-01 PROCEDURE — 85027 COMPLETE CBC AUTOMATED: CPT

## 2019-01-01 PROCEDURE — 99214 OFFICE O/P EST MOD 30 MIN: CPT | Performed by: INTERNAL MEDICINE

## 2019-01-01 PROCEDURE — 85027 COMPLETE CBC AUTOMATED: CPT | Performed by: INTERNAL MEDICINE

## 2019-01-01 PROCEDURE — 93312 ECHO TRANSESOPHAGEAL: CPT | Performed by: INTERNAL MEDICINE

## 2019-01-01 PROCEDURE — 93455 CORONARY ART/GRFT ANGIO S&I: CPT | Performed by: INTERNAL MEDICINE

## 2019-01-01 PROCEDURE — 83036 HEMOGLOBIN GLYCOSYLATED A1C: CPT | Performed by: INTERNAL MEDICINE

## 2019-01-01 RX ORDER — ASPIRIN 325 MG
325 TABLET, DELAYED RELEASE (ENTERIC COATED) ORAL DAILY
Status: DISCONTINUED | OUTPATIENT
Start: 2019-01-01 | End: 2019-01-01 | Stop reason: HOSPADM

## 2019-01-01 RX ORDER — FENTANYL CITRATE 50 UG/ML
INJECTION, SOLUTION INTRAMUSCULAR; INTRAVENOUS AS NEEDED
Status: DISCONTINUED | OUTPATIENT
Start: 2019-01-01 | End: 2019-01-01 | Stop reason: HOSPADM

## 2019-01-01 RX ORDER — DEXTROSE MONOHYDRATE 25 G/50ML
25 INJECTION, SOLUTION INTRAVENOUS
Status: DISCONTINUED | OUTPATIENT
Start: 2019-01-01 | End: 2019-01-01 | Stop reason: HOSPADM

## 2019-01-01 RX ORDER — ALPRAZOLAM 0.25 MG/1
0.25 TABLET ORAL 3 TIMES DAILY PRN
Status: DISCONTINUED | OUTPATIENT
Start: 2019-01-01 | End: 2019-01-01 | Stop reason: HOSPADM

## 2019-01-01 RX ORDER — LIDOCAINE HYDROCHLORIDE 10 MG/ML
5 INJECTION, SOLUTION EPIDURAL; INFILTRATION; INTRACAUDAL; PERINEURAL AS NEEDED
Status: DISCONTINUED | OUTPATIENT
Start: 2019-01-01 | End: 2019-01-01 | Stop reason: HOSPADM

## 2019-01-01 RX ORDER — NICOTINE POLACRILEX 4 MG
15 LOZENGE BUCCAL
Status: DISCONTINUED | OUTPATIENT
Start: 2019-01-01 | End: 2019-01-01 | Stop reason: HOSPADM

## 2019-01-01 RX ORDER — TEMAZEPAM 7.5 MG/1
7.5 CAPSULE ORAL NIGHTLY PRN
Status: DISCONTINUED | OUTPATIENT
Start: 2019-01-01 | End: 2019-01-01 | Stop reason: HOSPADM

## 2019-01-01 RX ORDER — MORPHINE SULFATE 2 MG/ML
1 INJECTION, SOLUTION INTRAMUSCULAR; INTRAVENOUS EVERY 4 HOURS PRN
Status: DISCONTINUED | OUTPATIENT
Start: 2019-01-01 | End: 2019-01-01 | Stop reason: HOSPADM

## 2019-01-01 RX ORDER — SODIUM CHLORIDE 0.9 % (FLUSH) 0.9 %
3 SYRINGE (ML) INJECTION EVERY 12 HOURS SCHEDULED
Status: CANCELLED | OUTPATIENT
Start: 2019-01-01

## 2019-01-01 RX ORDER — GLIMEPIRIDE 4 MG/1
4 TABLET ORAL 2 TIMES DAILY
COMMUNITY

## 2019-01-01 RX ORDER — NITROGLYCERIN 0.4 MG/1
0.4 TABLET SUBLINGUAL
Status: CANCELLED | OUTPATIENT
Start: 2019-01-01 | End: 2019-01-01

## 2019-01-01 RX ORDER — MIDAZOLAM HYDROCHLORIDE 1 MG/ML
INJECTION INTRAMUSCULAR; INTRAVENOUS AS NEEDED
Status: DISCONTINUED | OUTPATIENT
Start: 2019-01-01 | End: 2019-01-01 | Stop reason: HOSPADM

## 2019-01-01 RX ORDER — ACETAMINOPHEN 325 MG/1
650 TABLET ORAL EVERY 4 HOURS PRN
Status: DISCONTINUED | OUTPATIENT
Start: 2019-01-01 | End: 2019-01-01 | Stop reason: HOSPADM

## 2019-01-01 RX ORDER — NALOXONE HCL 0.4 MG/ML
0.4 VIAL (ML) INJECTION
Status: DISCONTINUED | OUTPATIENT
Start: 2019-01-01 | End: 2019-01-01 | Stop reason: HOSPADM

## 2019-01-01 RX ORDER — HYDROCODONE BITARTRATE AND ACETAMINOPHEN 5; 325 MG/1; MG/1
1 TABLET ORAL EVERY 4 HOURS PRN
Status: DISCONTINUED | OUTPATIENT
Start: 2019-01-01 | End: 2019-01-01 | Stop reason: HOSPADM

## 2019-01-01 RX ORDER — SODIUM CHLORIDE 9 MG/ML
250 INJECTION, SOLUTION INTRAVENOUS CONTINUOUS
Status: ACTIVE | OUTPATIENT
Start: 2019-01-01 | End: 2019-01-01

## 2019-01-01 RX ORDER — METOPROLOL TARTRATE 50 MG/1
50 TABLET, FILM COATED ORAL ONCE AS NEEDED
Status: COMPLETED | OUTPATIENT
Start: 2019-01-01 | End: 2019-01-01

## 2019-01-01 RX ORDER — GLIMEPIRIDE 4 MG/1
4 TABLET ORAL
Qty: 90 TABLET | Refills: 3
Start: 2019-01-01 | End: 2019-01-01 | Stop reason: SDUPTHER

## 2019-01-01 RX ORDER — GLIMEPIRIDE 4 MG/1
4 TABLET ORAL
Qty: 90 TABLET | Refills: 3 | Status: ON HOLD
Start: 2019-01-01 | End: 2019-01-01

## 2019-01-01 RX ORDER — MIDAZOLAM HYDROCHLORIDE 1 MG/ML
INJECTION INTRAMUSCULAR; INTRAVENOUS
Status: COMPLETED | OUTPATIENT
Start: 2019-01-01 | End: 2019-01-01

## 2019-01-01 RX ORDER — GLIMEPIRIDE 4 MG/1
TABLET ORAL
Qty: 180 TABLET | Refills: 3 | Status: SHIPPED | OUTPATIENT
Start: 2019-01-01 | End: 2019-01-01

## 2019-01-01 RX ORDER — LIDOCAINE HYDROCHLORIDE 10 MG/ML
INJECTION, SOLUTION EPIDURAL; INFILTRATION; INTRACAUDAL; PERINEURAL AS NEEDED
Status: DISCONTINUED | OUTPATIENT
Start: 2019-01-01 | End: 2019-01-01 | Stop reason: HOSPADM

## 2019-01-01 RX ORDER — METOPROLOL TARTRATE 50 MG/1
50 TABLET, FILM COATED ORAL ONCE
Status: DISCONTINUED | OUTPATIENT
Start: 2019-01-01 | End: 2019-01-01 | Stop reason: HOSPADM

## 2019-01-01 RX ORDER — METOPROLOL TARTRATE 50 MG/1
100 TABLET, FILM COATED ORAL ONCE AS NEEDED
Status: COMPLETED | OUTPATIENT
Start: 2019-01-01 | End: 2019-01-01

## 2019-01-01 RX ORDER — FENTANYL CITRATE 50 UG/ML
INJECTION, SOLUTION INTRAMUSCULAR; INTRAVENOUS
Status: COMPLETED | OUTPATIENT
Start: 2019-01-01 | End: 2019-01-01

## 2019-01-01 RX ADMIN — ASPIRIN 325 MG: 325 TABLET, DELAYED RELEASE ORAL at 13:02

## 2019-01-01 RX ADMIN — METOPROLOL TARTRATE 50 MG: 50 TABLET ORAL at 09:39

## 2019-01-01 RX ADMIN — MIDAZOLAM HYDROCHLORIDE 2 MG: 1 INJECTION, SOLUTION INTRAMUSCULAR; INTRAVENOUS at 09:55

## 2019-01-01 RX ADMIN — FENTANYL CITRATE 50 MCG: 50 INJECTION, SOLUTION INTRAMUSCULAR; INTRAVENOUS at 09:55

## 2019-01-01 RX ADMIN — IOPAMIDOL 99 ML: 755 INJECTION, SOLUTION INTRAVENOUS at 11:50

## 2019-01-01 RX ADMIN — MIDAZOLAM HYDROCHLORIDE 2 MG: 1 INJECTION, SOLUTION INTRAMUSCULAR; INTRAVENOUS at 09:58

## 2019-01-01 RX ADMIN — METHOHEXITAL SODIUM 20 MG: 500 INJECTION, POWDER, LYOPHILIZED, FOR SOLUTION INTRAMUSCULAR; INTRAVENOUS; RECTAL at 10:20

## 2019-01-01 RX ADMIN — FENTANYL CITRATE 50 MCG: 50 INJECTION, SOLUTION INTRAMUSCULAR; INTRAVENOUS at 10:05

## 2019-01-01 RX ADMIN — MIDAZOLAM HYDROCHLORIDE 2 MG: 1 INJECTION, SOLUTION INTRAMUSCULAR; INTRAVENOUS at 10:05

## 2019-01-01 RX ADMIN — METHOHEXITAL SODIUM 20 MG: 500 INJECTION, POWDER, LYOPHILIZED, FOR SOLUTION INTRAMUSCULAR; INTRAVENOUS; RECTAL at 10:01

## 2019-01-01 RX ADMIN — FENTANYL CITRATE 50 MCG: 50 INJECTION, SOLUTION INTRAMUSCULAR; INTRAVENOUS at 09:58

## 2019-02-27 NOTE — PROGRESS NOTES
Subjective   Cuco Easton is a 68 y.o. male.     History of Present Illness   For follow up of  Aortic valve replacement, doing well, no chest pain, sob or palpitations.  CAD no chest pain or sob.  NIDDM sugars have generally good.  Hyperlipidemia on meds, no muscle aches.  HTN on meds, no headaches.    The following portions of the patient's history were reviewed and updated as appropriate: allergies, current medications, past family history, past medical history, past social history, past surgical history and problem list.    Review of Systems   Constitutional: Negative.  Negative for activity change, appetite change, fatigue and fever.   HENT: Negative.  Negative for dental problem, ear pain, hearing loss, postnasal drip, rhinorrhea, sinus pressure, sore throat, trouble swallowing and voice change.    Eyes: Negative.  Negative for redness and visual disturbance.   Respiratory: Negative.  Negative for cough, chest tightness, shortness of breath and wheezing.    Cardiovascular: Negative.  Negative for chest pain, palpitations and leg swelling.   Gastrointestinal: Negative.  Negative for abdominal distention, abdominal pain, blood in stool, constipation, diarrhea and nausea.   Endocrine: Negative.  Negative for polydipsia and polyuria.   Genitourinary: Negative.  Negative for decreased urine volume, dysuria, hematuria and urgency.   Musculoskeletal: Negative.  Negative for arthralgias, back pain and neck pain.   Skin: Negative.  Negative for pallor and rash.   Allergic/Immunologic: Negative.    Neurological: Negative.  Negative for dizziness, tremors, speech difficulty, weakness, numbness and confusion.   Hematological: Negative.  Negative for adenopathy.   Psychiatric/Behavioral: Negative.  Negative for agitation, behavioral problems, dysphoric mood, sleep disturbance, negative for hyperactivity and depressed mood. The patient is not nervous/anxious.        Objective   Physical Exam   Constitutional: He is  oriented to person, place, and time. He appears well-developed and well-nourished.   HENT:   Head: Normocephalic and atraumatic.   Right Ear: External ear normal.   Left Ear: External ear normal.   Nose: Nose normal.   Mouth/Throat: Oropharynx is clear and moist.   Eyes: Conjunctivae and EOM are normal. Pupils are equal, round, and reactive to light.   Fundoscopic exam:       The right eye shows no AV nicking and no hemorrhage.        The left eye shows no AV nicking and no hemorrhage.   Neck: Normal range of motion. Neck supple. No thyromegaly present.   Cardiovascular: Normal rate, regular rhythm and intact distal pulses. Exam reveals no gallop and no friction rub.   Murmur (small systolic murmur.) heard.  Carotids normal.   Pulmonary/Chest: Effort normal and breath sounds normal. No respiratory distress. He has no wheezes. He has no rales. He exhibits no tenderness.   Abdominal: Soft. Bowel sounds are normal. He exhibits no distension and no mass. There is no tenderness. No hernia.   Genitourinary: Rectum normal, prostate normal, testes normal and penis normal.   Musculoskeletal: Normal range of motion. He exhibits no edema.   Lymphadenopathy:     He has no cervical adenopathy.   Neurological: He is alert and oriented to person, place, and time. He has normal reflexes. No cranial nerve deficit.   Skin: Skin is warm and dry.   Psychiatric: He has a normal mood and affect. His behavior is normal. Judgment and thought content normal.   Nursing note and vitals reviewed.        Assessment/Plan   Cuco was seen today for aortic valve disorder.    Diagnoses and all orders for this visit:    Aortic valve disorder    Coronary arteriosclerosis in native artery    Mixed hyperlipidemia  -     Lipid Panel  -     Comprehensive Metabolic Panel    Controlled type 2 diabetes mellitus without complication, without long-term current use of insulin (CMS/[facility])  -     Hemoglobin A1c  -     POC Microalbumin    Screening for prostate  cancer  -     PSA Screen    Drug therapy  -     CBC (No Diff)    Other orders  -     glimepiride (AMARYL) 4 MG tablet; Take 1 tablet by mouth Every Morning Before Breakfast.    All problems are stable.  Labs as noted.  Same med.  Recheck 6 months.

## 2019-09-03 PROBLEM — R94.39 ABNORMAL STRESS TEST: Status: ACTIVE | Noted: 2019-01-01

## 2019-09-03 NOTE — TELEPHONE ENCOUNTER
Carina Gannon calling for her  Cuco Easton who had a stress test with Dr Casiano. She said he did not like something he saw on the test so Cuco is going tomorrow for a heart cath. Carina cancelled their appt with you on Thursday and will call after this procedure to reschedule. If needed Carina can be reached at [phone]

## 2019-09-04 NOTE — DISCHARGE INSTR - ACTIVITY
Limit use of LEFT UPPER EXTREMITY for the next 2-5 days. No heavy push/pull efforts for 24 hours, no lifting over 10 pounds for the next five days.

## 2019-09-04 NOTE — H&P
Pre-Cardiac Catheterization Report  Cardiovascular Laboratory  [facility]      Patient:  Cuco Easton  :  1950  PCP:  Reji Duran MD  PHONE:  [phone]    DATE: 2019    BRIEF HPI:  Cuco Easton is a 69 y.o. male with hypertension, hypercholesterolemia, diabetes mellitus, known coronary artery disease.  He is post previous CABG and AVR.  He is complaining of a 6-week history of chest pain which he describes as a pressure.  He states it is moderate in severity lasting minutes with associated shortness of breath and dyspnea on exertion.  His symptoms increase with exertion and are relieved with rest.  He recently underwent an abnormal stress test and now presents for left heart catheterization with possible intervention.    Cardiac Risk Factors:  advanced age (older than 55 for men, 65 for women), diabetes mellitus, dyslipidemia, hypertension, male gender    Anginal class in last 2 weeks:  CCS class III    CHF Class in last 2 weeks:  NYHA Class II    Cardiogenic shock:  no    Cardiac arrest <24 hours:  no    Stress test within last 6 months:   yes   Details:    Previous cardiac catheterization:  yes  Details:     Previous CABG:  yes  Details:      Allergies:     IV contrast allergy:  no  No Known Allergies    MEDICATIONS:  Prior to Admission medications    Medication Sig Start Date End Date Taking? Authorizing Provider   aspirin 81 MG tablet     Kev Roca MD   atorvastatin (LIPITOR) 20 MG tablet  13   Kve Roca MD   glimepiride (AMARYL) 4 MG tablet Take 1 tablet by mouth Every Morning Before Breakfast. 19   Reji Duran MD   glucose blood (ONE TOUCH ULTRA TEST) test strip Use bid 19   Reji Duran MD   lisinopril (PRINIVIL,ZESTRIL) 5 MG tablet  13   Kev Roca MD   metFORMIN (GLUCOPHAGE) 500 MG tablet Take 2 tablets by mouth 2 (Two) Times a Day With Meals. 19   Reji Duran MD   metoprolol  tartrate (LOPRESSOR) 50 MG tablet 50 mg 2 (two) times a day. 11/8/13   Kev Roca MD   Multiple Vitamin (MULTI-VITAMIN) tablet     Kev Roca MD   Omega-3 Fatty Acids (FISH OIL) 1000 MG capsule capsule Take 1 capsule by mouth daily. 12/5/13   Kev Roca MD   tadalafil (CIALIS) 20 MG tablet  9/2/15   Kev Roca MD   Unable to find Med Name: Osteo-Bi-Flex TABS    Kev Roca MD       Past medical & surgical history, social and family history reviewed in the electronic medical record.    ROS:  Cardiovascular ROS: positive for - chest pain, dyspnea on exertion and shortness of breath    Physical Exam:    Vitals: There were no vitals filed for this visit. There were no vitals filed for this visit.    General Appearance:    Alert, cooperative, in no acute distress   Head:    Normocephalic, without obvious abnormality, atraumatic   Eyes:            Lids and lashes normal, conjunctivae and sclerae normal, no   icterus, no pallor, corneas clear, PERRLA   Ears:    Ears appear intact with no abnormalities noted   Neck:   No adenopathy, supple, trachea midline, no thyromegaly, no   carotid bruit, no JVD   Back:     No kyphosis present, no scoliosis present, range of motion normal   Lungs:     Clear to auscultation,respirations regular, even and                  unlabored    Heart:    Regular rhythm and normal rate, normal S1 and S2, 3/6 systolic murmur, no gallop, no rub, no click   Chest Wall:    No abnormalities observed   Abdomen:     Normal bowel sounds, no masses, no organomegaly, soft        non-tender, non-distended, no guarding, no rebound                tenderness   Rectal:     Deferred   Extremities:   Moves all extremities well, no edema, no cyanosis, no             redness   Pulses:   Pulses palpable and equal bilaterally   Skin:   No bleeding, bruising or rash   Neurologic:   Cranial nerves 2 - 12 grossly intact, sensation intact     Barbaeu Test:  Left:  Normal  (oxymetric Allens) Right: Not Assessed             Lab Results   Component Value Date    CHLPL 136 12/14/2015    TRIG 131 02/27/2019    HDL 44 02/27/2019    AST 24 02/27/2019    ALT 31 02/27/2019           Impression      · Abnormal stress test    Plan     · Procedure to perform: [facility]  · Planned access: Left radial artery              SARIHA Hargrove  09/04/19  11:58 AM

## 2019-09-05 NOTE — TELEPHONE ENCOUNTER
BRIANNA with patient to say that I received his consult for valve in valve TAVR yesterday after per Dr. Eldridge.  Left my contact number [phone].  I will be arranging CT Surgery visit, BRIANNA, and CTA then we will get him scheduled for the TAVR procedure.

## 2019-09-09 NOTE — TELEPHONE ENCOUNTER
Patient returned my call.  I reviewed upcoming pre-TAVR studies.  CTA on 9/17.  Check in 0930 at [facility] registration. No metformin x 48 hours.  No caffeine but OK light breakfast by 9 AM. Secondly, BRIANNA on 9/23/19.  NPO after MN but meds OK with small sip of water.  Bring .  Lastly, CT Surgery consult  10/1/19 @ 0930.  TAVR procedure date will be scheduled following CT Surgery consultation.  Mr. Easton read back to me the above information accurately.      Mira GUERRERO

## 2019-09-17 NOTE — PROGRESS NOTES
TAVR APRN Evaluation    Cuco Easton, 1950, [id number]     09/17/19    PCP: Reji Duran MD  Primary Cardiologist: Cuco Casiano MD    TAVR Team:  1.  Sina Harris MD  2.  Jim Gallego MD  3.  Carina Eldridge MD  4.  Lainey Curry MD    Chief Complaint: Re-stenosis of bioprosthetic aortic valve      Identification: This is a 69 y.o. year old male from [address] [place] [address].    History of Present Illness: Mr. Easton was referred for consideration of TAVR due to chest pressure and TRUJILLO.  Recent cardiac catheter and echocardiogram per Dr. 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  This original prosthesis was placed by Dr. Fortune 11/4/2013.     His current aortic valve indices are as follows:  AMITA 0.6 cm2, Aortic valve Vmax 4.8 m/sec, and AV mean gradient 66 mm Hg.  Patient states symptoms began approximately 10-12 weeks ago.  Exertion brings on most or his symptoms.  He can walk  years before becoming symptomatic.  Either slowing down or resting allows symptoms to resolve.      Problem List:   Patient Active Problem List   Diagnosis   • Aortic valve disorder   • Coronary arteriosclerosis in native artery   • Chronic diastolic congestive heart failure (CMS/HCC)   • Mixed hyperlipidemia   • Controlled type 2 diabetes mellitus without complication, without long-term current use of insulin (CMS/HCC)   • Abnormal stress test       Past Surgical History:  Past Surgical History:   Procedure Laterality Date   • AORTIC VALVE REPAIR/REPLACEMENT  11/04/2013    Replacement   • CARDIAC CATHETERIZATION N/A 9/4/2019    Procedure: Left Heart Cath;  Surgeon: Cuco Casiano MD;  Location: [facility] CATH INVASIVE LOCATION;  Service: Cardiovascular   • CATARACT EXTRACTION, BILATERAL     • COLONOSCOPY      (Fiberoptic)   • CORONARY ARTERY BYPASS GRAFT  11/04/2013   • VASECTOMY      History of Surgery Vas  Deferens Vasectomy       Allergies:  Patient has no known allergies.    Social History:  Social History     Socioeconomic History   • Marital status:      Spouse name: Carina   • Years of education:    • Occupation Retired  for [employer]   Tobacco Use   • Smoking status: Former Smoker     Packs/day: 0.50     Years: 35.00     Pack years: 17.50     Types: Cigarettes     Last attempt to quit: 2013     Years since quittin.8   • Smokeless tobacco: Current User     Types: Chew   Substance and Sexual Activity   • Alcohol use: Yes     Comment: social drinker   • Drug use: No       Current Medications:    Current Outpatient Medications:   •  aspirin 81 MG tablet, , Disp: , Rfl:   •  atorvastatin (LIPITOR) 20 MG tablet, , Disp: , Rfl:   •  glimepiride (AMARYL) 4 MG tablet, Take 4 mg by mouth 2 (Two) Times a Day., Disp: , Rfl:   •  glucose blood (ONE TOUCH ULTRA TEST) test strip, Use bid, Disp: 100 each, Rfl: 6  •  lisinopril (PRINIVIL,ZESTRIL) 5 MG tablet, Take 5 mg by mouth Daily., Disp: , Rfl:   •  MEGARED OMEGA-3 KRILL OIL PO, Take 2,000 mg by mouth Daily., Disp: , Rfl:   •  metFORMIN (GLUCOPHAGE) 500 MG tablet, Take 2 tablets by mouth 2 (Two) Times a Day With Meals., Disp: 360 tablet, Rfl: 3  •  metoprolol tartrate (LOPRESSOR) 50 MG tablet, 50 mg 2 (two) times a day., Disp: , Rfl:   •  Misc Natural Products (OSTEO BI-FLEX ADV JOINT SHIELD PO), Take 1 tablet by mouth Every Night., Disp: , Rfl:   •  Multiple Vitamin (MULTI-VITAMIN) tablet, Take 1 tablet by mouth Daily., Disp: , Rfl:     Current Facility-Administered Medications:   •  lidocaine PF 1% (XYLOCAINE) injection 5 mL, 5 mL, Intradermal, PRN, Ozzy Fritz, DO  •  metoprolol tartrate (LOPRESSOR) tablet 50 mg, 50 mg, Oral, Once, Ozzy Fritz, DO    Review of Systems:  Review of Systems   Constitution: Positive for malaise/fatigue.   HENT: Negative.    Eyes: Negative.    Cardiovascular: Positive for chest pain and dyspnea  "on exertion.   Respiratory: Positive for shortness of breath.    Endocrine: Negative.    Hematologic/Lymphatic: Negative.    Skin: Negative.    Musculoskeletal: Positive for arthritis.   Gastrointestinal: Negative.    Genitourinary: Negative.    Neurological: Positive for light-headedness.   Psychiatric/Behavioral: Negative.    Allergic/Immunologic: Negative.         Physical Exam:  Physical Exam   Constitutional: He is oriented to person, place, and time. He appears well-developed and well-nourished. No distress.   HENT:   Head: Normocephalic and atraumatic.   Eyes: Conjunctivae are normal. Pupils are equal, round, and reactive to light.   Neck: Normal range of motion. Neck supple. No JVD present.   Cardiovascular: Normal rate, regular rhythm and intact distal pulses. Exam reveals no gallop and no friction rub.   Murmur heard.  Harsh systolic murmur III/VI LUSB    Pulmonary/Chest: Effort normal and breath sounds normal. No respiratory distress. He has no wheezes. He has no rales.   Musculoskeletal: Normal range of motion. He exhibits no edema or deformity.   Neurological: He is alert and oriented to person, place, and time. No cranial nerve deficit.   Skin: Skin is warm and dry.   Psychiatric: He has a normal mood and affect. His behavior is normal. Judgment and thought content normal.   Vitals reviewed.      Vitals:    09/17/19 0913 09/17/19 0939 09/17/19 1055 09/17/19 1158   BP: 123/85 123/89 108/71 108/65   BP Location: Left arm      Patient Position: Lying      Pulse: 80 85 73 81   Resp: 18 18 18 18   Temp: 97.8 °F (36.6 °C)      SpO2: 97%      Weight: 95.3 kg (210 lb)      Height: 185 cm (72.84\")          Diagnostic Data:  Transthoracic echo: 8/26/19 as noted above in HPI    Transesophageal echo: scheduled for 9/23/19    Cardiac Cath:               Angiographic Findings:  Coronary dominance, right  · LM:   Normal  · LAD: Luminal irregularities with ostial 70% eccentric stenosis of a diagonal branch with, " competitive flow from the bypass graft  · LCX: Luminal irregularities  · RCA: Eccentric mid vessel 90% stenosis with competitive flow from the bypass graft  · LIMA: Not uses bypass graft  · SVG to PDA: Widely patent  · SVG to LAD diagonal: Widely patent  · Aortic root angiography: No significant aortic insufficiency appears at the leaflets are restricted in motion  It should be noted that the images of the left system must have been done under fluoroscopy instead of cine angiography because they were not saved in the system      Two-vessel CAD with 2/2 patent bypass grafts  Adequately revascularized myocardium  Severe aortic valvular stenosis of the prosthetic aortic valve by echocardiography       CTA Chest, Abdomen, and Pelvis: today    Carotid Doppler: pending    Functional Assessment Data:    KCQ12 Questionnaire Score: (see scanned copy) 42/70      [place] Basic Activities of Daily Living (ADL) Scale    Bathing (sponge bath, tub bath, or shower).  Receives either no assistance,   or assistance with bathing only on body part.   Yes    Dressing Gets clothes and dresses without any assistance, except for  tying shoes.        Yes    Toileting Goes to toilet room, uses toilet, arranges clothes, and returns  without any assistance (may use cane or walker for support and may use  bedpan / urinal at night.      Yes    Transferring Moves into and out of bed and chair without assistance  (may use cane or walker)      Yes    Continence Controls bowel and bladder completely without occasional  accidents        Yes    Feeding Feeds self without assistance (except for help with cutting meat  or buttering bread)       Yes        Total (Number of Yesses of 6) 6/6      [place] Instrumental Activities of Daily Living Scale (IADL)    Ability to Use Telephone   · Operates telephone on own initiative.  Looks up and dials numbers, etc.         1  Shopping  · Takes care of all shopping needs indepently  1    Food  Preparation  · Plans, prepares, and serves adequate meals independently          1  Housekeeping  · Maintains house alone or with occasional assistance,   e.g. heavy work domestic help    1     · Laundry: All laundry must be done by others   0    Mode of Transportation  · Travels independently on public transportation or drives own car  ·         1    Responsibility for Own Medications  · Is responsible for taking medications in correct dosages at correct time          1    Ability to Handle Finances  · Manages financial matters independently (budgets, writes checks,   pays rent, bills, goes to bank), collects and keeps track of income          1     Total (Number of Instrumental Activities of 8) 7/8       Frailty Assessment Utilizing the Hydraulic Hand Dynamometer    Dominate Hand: Right    Have the patient sit comfortably in a chair.  Elbow should be flexed to 90 degrees and may rest against the chest wall.  Forearm and wrist should be in a neutral position.    1. Place the dynamometer in the patient's dominate hand.  To prevent accidental dropping, the examiner may support the device at its base.    2. Instruct the patient to apply maximum -force smoothly and without rapid wrenching or jerking motion.    3. Test the dominate hand three times and record each peak hold-value (only in kg) below. Before each trial, reset the peak-hold needle to zero.  Record the mean of the three trials.    Trail 1: 40 kg    Trial 2 :  38 kg    Trial 3 : 35 kg    Mean  Strength in Dominate Hand: 38 kg      Female Scores  Male Scores   Age Group Right Hand Left Hand  Age Group Right Hand Left Hand   18-19 23-42 19-37  18-19 31-67 21-63   20-24 21-43 18-37  20-24 40-70 31-64   25-29 23-44 20-38  25-29 38-72 38-62   30-34 21-50 18-44  30-34 38-72 34-66   35-39 26-42 21-39  35-39 36-72 35-67   40-44 22-42 18-39  40-44 37-69 37-65   45-49 17-39 16-35  45-49 33-67 29-63   50-54 21-39 18-34  50-54 38-65 33-59   55-59 17-35 13-30   55-59 26-66 20-55   60-64 17-33 13-28  60-64 25-56 20-50   65-69 15-30 12-25  65-69 26-57 20-50   70-74 14-31 11-26 70-74 18-50 16-43   75 11-28 11-24  75 14-45 12-38       Five Meter Walk Test    Exclusions (check all that apply)     [] Clinically Unstable     [] Non-Ambulatory.  If checked, specify reason: NA    Utilized Walking Aid? No     Walk 1: 6.2 s/5m     Walk 2: 6.1 s/5m     Walk 3: 8.8 s/5m    Five Meter Walk Average: 7 s/5m    Gait Speed: Slow (Average > 6 s/5m)       STS risk of mortality with open AVR:  1.55%  http://riskcalc.sts.org/stswebriskcalc/calculate       Creatinine Clearance: 92.36   https://reference.Crzyfish/calculator/creatinine-clearance-cockcroft-gault    Assessment/ Plan:     ICD-10-CM ICD-9-CM   1. Aortic valve disorder/ degenerative bioprosthetic St. Miguel Trifecta valve I35.9 424.1   2. Coronary arteriosclerosis in native artery.  Patent 2/2 bypass grafts I25.10 414.01   3. Controlled type 2 diabetes mellitus without complication, without long-term current use of insulin (CMS/[facility]) E11.9 250.00   4. Diastolic heart failure due to valvular disease.  NYHA class II-III     5. Dyslipidemia.  Lipitor, fish oil       Met with patient, spouse, and sister today in [facility] A department following CTA chest, abdomen, and pelvis.  Discussed TAVR educational materials, reviewed PMH details, collected functional data, and copied original AVR implant card.  His next pre-requisite study is a BRIANNA planned for 9/23/19 then CT Surgery consultation on 10/1/19.  We also discussed expected post procedure course and limitations after DC home.  Anticipated tentative TAVR procedure date is 10/24/19.      YOLANDA Haas, 09/17/19, 1:42 PM

## 2019-09-24 NOTE — H&P
[facility] Consult Note      Referring Provider: Mira Duffy APRN  Primary Provider:  Reji Duran MD  Reason for Consultation: Aortic valve disorder    Problem list:    1. Aortic valve disorder  1. S/p St. Miguel Trifecta (valve size #25).   2. Echo 8/26/19: AMITA 0.6 cm2, aortic valve V max 4.8 m/sec, AV mean gradient 66 mmHg. EF 50%, moderate LVH  2. Hypertension  3. Hypercholesterolemia  4. Diabetes  5. Coronary artery disease  1. S/p CABG  2. LHC 9/3/19: liminal irregularities with ostial 70% eccentric stenosis of a diagonal branch with competitive flow from bypass graft, eccentric mid RCA 90% stenosis with competitive flow from bypass graft, SVG to PDA widely patent, SVG to LAD diagonal widely patent, aortic root angiography- no sig AI, leaflets are restrictive in motion.   6. Diastolic heart failure due to valvular disease  7. Dyslipidemia    Allergies:  Patient has no known allergies.    Home/Current Medications:      Current Outpatient Medications:   •  aspirin 81 MG tablet, , Disp: , Rfl:   •  atorvastatin (LIPITOR) 20 MG tablet, , Disp: , Rfl:   •  glimepiride (AMARYL) 4 MG tablet, Take 4 mg by mouth 2 (Two) Times a Day., Disp: , Rfl:   •  glucose blood (ONE TOUCH ULTRA TEST) test strip, Use bid, Disp: 100 each, Rfl: 6  •  lisinopril (PRINIVIL,ZESTRIL) 5 MG tablet, Take 5 mg by mouth Daily., Disp: , Rfl:   •  MEGARED OMEGA-3 KRILL OIL PO, Take 2,000 mg by mouth Daily., Disp: , Rfl:   •  metFORMIN (GLUCOPHAGE) 500 MG tablet, Take 2 tablets by mouth 2 (Two) Times a Day With Meals., Disp: 360 tablet, Rfl: 3  •  metoprolol tartrate (LOPRESSOR) 50 MG tablet, 50 mg 2 (two) times a day., Disp: , Rfl:   •  Misc Natural Products (OSTEO BI-FLEX ADV JOINT SHIELD PO), Take 1 tablet by mouth Every Night., Disp: , Rfl:   •  Multiple Vitamin (MULTI-VITAMIN) tablet, Take 1 tablet by mouth Daily., Disp: , Rfl:       History of present illness:  Mr. Easton is a 70 y/o male with the above noted pmhx who  presents today for BRIANNA for TAVR workup. He has a h/o aortic valve disorder, s/p ST. Miguel Trifecta (%24) in the past and has now developed severe aortic valve stenosis. He has planned consultation with CTS on 10/1/19.       Social History:  Social History     Socioeconomic History   • Marital status:      Spouse name: Not on file   • Number of children: Not on file   • Years of education: Not on file   • Highest education level: Not on file   Tobacco Use   • Smoking status: Former Smoker     Packs/day: 0.50     Years: 35.00     Pack years: 17.50     Types: Cigarettes     Last attempt to quit: 2013     Years since quittin.8   • Smokeless tobacco: Current User     Types: Chew   Substance and Sexual Activity   • Alcohol use: Yes     Comment: social drinker   • Drug use: No   • Sexual activity: Defer       Family History:  Family History   Problem Relation Age of Onset   • Cancer Sister    • Cardiomyopathy Sister    • Hypertension Other        Review of Systems  Pertinent positives are listed in the HPI.  All other systems reviewed are negative.     Objective     Vital Sign Min/Max for last 24 hours  No Data Recorded   No Data Recorded   No Data Recorded   No Data Recorded   No Data Recorded   No Data Recorded   No Data Recorded         Physical Exam:  GENERAL: This is a well-developed, well-nourished, male who is in no acute distress. Alert and oriented x3. Normal mood and affect.   SKIN: Pink and warm without rash or abnormality noted.   HEENT: Head is normocephalic and atraumatic. Pupils are equal and reactive to light bilaterally. Mucous membranes are pink and moist.   NECK: Supple without lymphadenopathy or thyromegaly. There is no jugular venous distention at 30°.  LUNGS: Clear to auscultation bilaterally without wheezing, rhonchi, or rales noted.   CARDIOVASCULAR: The heart has a regular rate with a normal S1 and S2. There is no gallop, rub, or click appreciated. 3/6 murmur. The PMI is  nondisplaced. Carotid upstrokes are 2+ and symmetrical without bruits.   ABDOMEN: Soft and nondistended with positive bowel sounds x4. The patient denies tenderness of palpitation.   MUSCULOSKELETAL: There are no obvious bony abnormalities. Normal range of tenderness to palpation.   NEUROLOGICAL: Nonfocal.   PERIPHERAL VASCULAR: Femoral pulses are 2+ and symmetrical without bruits. Posterior tibial and dorsalis pedis pulses are 2+ and symmetrical. There is 1+ peripheral edema.      EKG:  N/A  Tele: NSR    Labs:      Lab Results   Component Value Date    WBC 6.04 09/04/2019    HGB 14.3 09/04/2019    HCT 42 09/04/2019    MCV 99.1 (H) 09/04/2019     09/04/2019     Lab Results   Component Value Date    GLUCOSE 135 (H) 09/04/2019    BUN 18 09/04/2019    CREATININE 1.10 09/17/2019    EGFRIFNONA 71 09/04/2019    BCR 17.3 09/04/2019    K 4.3 09/04/2019    CO2 25.0 09/04/2019    CALCIUM 9.4 09/04/2019    ALBUMIN 4.59 02/27/2019    AST 24 02/27/2019    ALT 31 02/27/2019     No results found for: CKTOTAL, CKMB, CKMBINDEX, TROPONINI, TROPONINT  No results found for: INR, PROTIME  Lab Results   Component Value Date    CHOL 141 02/27/2019    CHLPL 136 12/14/2015    TRIG 131 02/27/2019    HDL 44 02/27/2019    LDL 69 02/27/2019        Ejection Fraction: 50%    Results Review:  I reviewed the patients new clinical results.      Assessment and Plan:  1) Severe aortic stenosis  degenerative bioprosthetic St. Miguel Trifecta Valve  1. S/p [employer]. Miguel Trifecta (valve size #25).   2. Echo 8/26/19: AMITA 0.6 cm2, aortic valve V max 4.8 m/sec, AV mean gradient 66 mmHg.  3. Symptomatic with exertional chest pain with recent negative LHC  4. Patient presents today for BRIANNA for TAVR workup. The risks, benefits, and alternatives of the procedure have been reviewed and the patient wishes to proceed.     2) CAD  - S/p CABG  - LHC 9/4/19: patent 2/2 grafts.   3) HLD  4) Diastolic heart failure due to valvular disease      Electronically signed by  Lesly Amin, APRN, 09/24/19, 9:16 AM.    Carina Eldridge MD, FACC

## 2019-10-01 PROBLEM — I71.21 ASCENDING AORTIC ANEURYSM (HCC): Status: ACTIVE | Noted: 2019-01-01

## 2019-10-01 NOTE — PROGRESS NOTES
10/01/2019  Patient Information  Cuco [place]on                                                                                          [address] [facility] [place] [address]   1950  'PCP/Referring Physician'  Reji Duran MD  [phone]  Mira Duffy, APRN  [phone]  Chief Complaint   Patient presents with   • Consult     Referral for TAVR evaluation       History of Present Illness: 69-year-old  male with a history of hypertension, hyperlipidemia, diabetes mellitus and coronary artery disease status post CABG who presents with chest pain.  Over the last few days, the patient notes worsening substernal chest pain.  This started approximately 3 months ago.  This sharp pain is exacerbated with activities such as climbing approximately 12 stairs and alleviated with rest.  He denies fatigue, shortness of breath or syncope.      Patient Active Problem List   Diagnosis   • Aortic valve disorder   • Coronary arteriosclerosis in native artery   • Chronic diastolic congestive heart failure (CMS/HCC)   • Mixed hyperlipidemia   • Controlled type 2 diabetes mellitus without complication, without long-term current use of insulin (CMS/HCC)   • Abnormal stress test     Past Medical History:   Diagnosis Date   • Atherosclerotic heart disease of native coronary artery without angina pectoris    • Cardiomyopathy (CMS/HCC)    • Diabetes mellitus (CMS/HCC)    • Encounter for long-term (current) use of medications    • Encounter for screening for malignant neoplasm of prostate    • Hyperlipidemia    • Polyp of sigmoid colon      Past Surgical History:   Procedure Laterality Date   • AORTIC VALVE REPAIR/REPLACEMENT  11/04/2013    Replacement   • CARDIAC CATHETERIZATION N/A 9/4/2019    Procedure: Left Heart Cath;  Surgeon: Cuco Casiano MD;  Location: [facility] INVASIVE LOCATION;  Service: Cardiovascular   • CATARACT EXTRACTION, BILATERAL     • COLONOSCOPY      (Fiberoptic)   • CORONARY ARTERY  BYPASS GRAFT  2013   • VASECTOMY      History of Surgery Vas Deferens Vasectomy       Current Outpatient Medications:   •  aspirin 81 MG tablet, , Disp: , Rfl:   •  atorvastatin (LIPITOR) 20 MG tablet, , Disp: , Rfl:   •  glimepiride (AMARYL) 4 MG tablet, Take 4 mg by mouth 2 (Two) Times a Day., Disp: , Rfl:   •  glucose blood (ONE TOUCH ULTRA TEST) test strip, Use bid, Disp: 100 each, Rfl: 6  •  lisinopril (PRINIVIL,ZESTRIL) 5 MG tablet, Take 5 mg by mouth Daily., Disp: , Rfl:   •  MEGARED OMEGA-3 KRILL OIL PO, Take 2,000 mg by mouth Daily., Disp: , Rfl:   •  metFORMIN (GLUCOPHAGE) 500 MG tablet, Take 2 tablets by mouth 2 (Two) Times a Day With Meals., Disp: 360 tablet, Rfl: 3  •  metoprolol tartrate (LOPRESSOR) 50 MG tablet, 50 mg 2 (two) times a day., Disp: , Rfl:   •  Misc Natural Products (OSTEO BI-FLEX ADV JOINT SHIELD PO), Take 1 tablet by mouth Every Night., Disp: , Rfl:   •  Multiple Vitamin (MULTI-VITAMIN) tablet, Take 1 tablet by mouth Daily., Disp: , Rfl:   No Known Allergies  Social History     Socioeconomic History   • Marital status:      Spouse name: Not on file   • Number of children: 2   • Years of education: Not on file   • Highest education level: Not on file   Occupational History     Comment: retired   Tobacco Use   • Smoking status: Former Smoker     Packs/day: 0.50     Years: 35.00     Pack years: 17.50     Types: Cigarettes     Last attempt to quit: 2013     Years since quittin.8   • Smokeless tobacco: Former User     Types: Chew   Substance and Sexual Activity   • Alcohol use: Yes     Comment: social drinker   • Drug use: No   • Sexual activity: Defer   Social History Narrative    Lives in [place], [place]     Family History   Problem Relation Age of Onset   • Cancer Sister    • Cardiomyopathy Sister    • Hypertension Other      Review of Systems   Constitution: Positive for chills, fever and malaise/fatigue. Negative for diaphoresis and weight loss.   HENT: Negative for  "congestion, hearing loss, hoarse voice and sore throat.    Eyes: Negative for blurred vision and double vision.   Cardiovascular: Positive for chest pain (with activity) and palpitations. Negative for claudication, dyspnea on exertion, leg swelling and syncope.   Respiratory: Positive for cough and shortness of breath. Negative for hemoptysis and wheezing.    Hematologic/Lymphatic: Does not bruise/bleed easily.   Skin: Negative for itching, poor wound healing and rash.   Musculoskeletal: Negative for arthritis, back pain, falls, joint pain, joint swelling, muscle cramps, muscle weakness and neck pain.   Gastrointestinal: Negative for abdominal pain, constipation, diarrhea, hematemesis, nausea and vomiting.   Genitourinary: Negative for dysuria, frequency, hematuria, hesitancy, incomplete emptying and urgency.   Neurological: Positive for dizziness, loss of balance and numbness. Negative for headaches, light-headedness and vertigo.   Psychiatric/Behavioral: Negative for depression, memory loss and substance abuse. The patient is not nervous/anxious.    Allergic/Immunologic: Positive for environmental allergies. Negative for HIV exposure.     Vitals:    10/01/19 0912   BP: 126/74   Pulse: 106   Temp: 97.7 °F (36.5 °C)   TempSrc: Temporal   SpO2: 98%   Weight: 97.2 kg (214 lb 3.2 oz)   Height: 185.4 cm (73\")      Physical Exam   Constitutional: He is oriented to person, place, and time. He appears well-developed and well-nourished. No distress.   HENT:   Head: Normocephalic and atraumatic.   Eyes: Conjunctivae are normal. No scleral icterus.   Neck: Normal range of motion. No JVD present. Carotid bruit is not present. No tracheal deviation present.   Cardiovascular: Normal rate and regular rhythm. Exam reveals no gallop and no friction rub.   Murmur heard.  II/VI systolic ejection murmur   Pulmonary/Chest: Effort normal and breath sounds normal. No stridor. No respiratory distress. He has no wheezes. He has no rales. "   Abdominal: Soft. He exhibits no distension and no mass. There is no tenderness. There is no rebound and no guarding.   Musculoskeletal: Normal range of motion. He exhibits no edema.   Neurological: He is alert and oriented to person, place, and time.   Skin: Skin is warm and dry. No rash noted. He is not diaphoretic. No erythema.   Psychiatric: He has a normal mood and affect. His behavior is normal. Judgment and thought content normal.       Labs/Imaging:  -CT TAVR protocol performed 9/18/2019, personally reviewed, demonstrates calcifications of the prosthetic aortic valve.  The ascending aorta measures 5.1 cm.  There is bilateral posterior common femoral artery calcifications.  -Transesophageal echocardiogram performed 9/24/2019, personally reviewed, demonstrates EF 40%, mild aortic regurgitation, severe aortic stenosis (aortic valve area 0.6 cm², peak velocity 513 cm/s, max peak gradient 105 mmHg), moderate mitral regurgitation and trace tricuspid regurgitation.  Grade 2 plaque is present in the ascending aorta and grade 3 plaque in the aortic arch and descending thoracic aorta.  -Cardiac catheterization performed 9/4/2019, personally reviewed, demonstrates 70% stenosis of a diagonal branch, 90% mid RCA stenosis.  The saphenous vein graft to the PDA and LAD are patent.    Assessment/Plan:  69-year-old  male with a history of hypertension, hyperlipidemia, diabetes mellitus and coronary artery disease status post CABG who presents with chest pain.  The patient has symptomatic severe aortic stenosis.  His calculated STS risk of mortality with surgical aortic valve replacement is 1.55%, placing him within the low risk category.  He is a reasonable candidate for valve in valve transcatheter aortic valve replacement.  The risks and benefits of the procedure were discussed with the patient including pain, bleeding, infection, stroke, heart block requiring a pacemaker, renal failure, myocardial infarction and  death.  The patient understood these risks and wished to proceed with surgery.    Patient Active Problem List   Diagnosis   • Aortic valve disorder   • Coronary arteriosclerosis in native artery   • Chronic diastolic congestive heart failure (CMS/HCC)   • Mixed hyperlipidemia   • Controlled type 2 diabetes mellitus without complication, without long-term current use of insulin (CMS/HCC)   • Abnormal stress test

## 2019-10-03 ENCOUNTER — TELEPHONE (OUTPATIENT)
Dept: INTERNAL MEDICINE | Facility: CLINIC | Age: 69
End: 2019-10-03

## 2019-10-09 NOTE — ADDENDUM NOTE
Addended by: MILLIE ROGERS on: 10/9/2019 10:32 AM     Modules accepted: Level of Service, SmartSet

## (undated) DEVICE — ANGIOGRAPHIC CATHETER: Brand: EXPO™

## (undated) DEVICE — CATH DIAG EXPO .045 FL3  5F 100CM

## (undated) DEVICE — MODEL AT P65, P/N 701554-001KIT CONTENTS: HAND CONTROLLER, 3-WAY HIGH-PRESSURE STOPCOCK WITH ROTATING END AND PREMIUM HIGH-PRESSURE TUBING: Brand: ANGIOTOUCH® KIT

## (undated) DEVICE — DEV COMP RAD PRELUDESYNC 24CM

## (undated) DEVICE — GW INQWIRE FC PTFE STD J/1.5 .035 260

## (undated) DEVICE — MODEL BT2000 P/N 700287-012KIT CONTENTS: MANIFOLD WITH SALINE AND CONTRAST PORTS, SALINE TUBING WITH SPIKE AND HAND SYRINGE, TRANSDUCER: Brand: BT2000 AUTOMATED MANIFOLD KIT

## (undated) DEVICE — CATH DIAG EXPO .045 AL1 5F 100CM

## (undated) DEVICE — CATH DIAG EXPO M/ PK 5F FL4/FR4 PIG

## (undated) DEVICE — INTRO SHEATH PRELUDE IDEAL SPRNG COIL 021 6F 23X80CM

## (undated) DEVICE — PK CATH CARD 10